# Patient Record
(demographics unavailable — no encounter records)

---

## 2025-02-14 NOTE — HISTORY OF PRESENT ILLNESS
[Home] : at home, [unfilled] , at the time of the visit. [Medical Office: ([facility])___] : at the medical office located in  [Telehealth (audio & video)] : This visit was provided via telehealth using real-time 2-way audio visual technology. [Verbal consent obtained from patient] : the patient, [unfilled] [FreeTextEntry1] : 44 para 1 () PMH anxiety/depression, denies PSH presents with urgency, UUI, frequency q 30 minutes - no pads, changes underwear. She denies JACIEL. She denies nocturia. She denies obstructive symptoms. She denies prolapse sensation. Denies UTIs/STIs. Denies bowel issues.  pvr - 0 ml  24  OAB responding to oxybutynin, 40% improvement.  Denies interval change in med/surg history.  Plan: Increase oxybutynin 15 mg daily F/u in 4-6 weeks  24  28 yo woman with oab-wet improved significantly since last visit when oxybutynin increased to 15, almost "normal" now at 60% improvement.  Denies interval change in med/surg history.  Plan: Continue oxybutynin 15 mg daily. She wants to try PT as well. We reviewed the options of pelvic floor physical therapy. We discussed the benefits of improved pelvic strength and balanced tone. We reviewed the basic goals and expected schedule. All questions answered regarding pelvic floor physical therapy.  25  27-year-old woman with OAB-wet currently on anticholinergic with continued stability, however feels like there is definitely room for improvement.  She has been going to physical therapy and she states it has helped as well in terms of her irritative symptoms.  Plan: Given the fact that she feels like there is room for improvement, I have recommended changing her anticholinergic to Gemtesa 75 mg p.o. daily, side effects reviewed.  Continue with physical therapy and performing exercises at home.  Follow-up in 6 to 8 weeks.

## 2025-02-14 NOTE — HISTORY OF PRESENT ILLNESS
[Home] : at home, [unfilled] , at the time of the visit. [Medical Office: ([facility])___] : at the medical office located in  [Telehealth (audio & video)] : This visit was provided via telehealth using real-time 2-way audio visual technology. [Verbal consent obtained from patient] : the patient, [unfilled] [FreeTextEntry1] : 44 para 1 () PMH anxiety/depression, denies PSH presents with urgency, UUI, frequency q 30 minutes - no pads, changes underwear. She denies JACIEL. She denies nocturia. She denies obstructive symptoms. She denies prolapse sensation. Denies UTIs/STIs. Denies bowel issues.  pvr - 0 ml  24  OAB responding to oxybutynin, 40% improvement.  Denies interval change in med/surg history.  Plan: Increase oxybutynin 15 mg daily F/u in 4-6 weeks  24  26 yo woman with oab-wet improved significantly since last visit when oxybutynin increased to 15, almost "normal" now at 60% improvement.  Denies interval change in med/surg history.  Plan: Continue oxybutynin 15 mg daily. She wants to try PT as well. We reviewed the options of pelvic floor physical therapy. We discussed the benefits of improved pelvic strength and balanced tone. We reviewed the basic goals and expected schedule. All questions answered regarding pelvic floor physical therapy.  25  27-year-old woman with OAB-wet currently on anticholinergic with continued stability, however feels like there is definitely room for improvement.  She has been going to physical therapy and she states it has helped as well in terms of her irritative symptoms.  Plan: Given the fact that she feels like there is room for improvement, I have recommended changing her anticholinergic to Gemtesa 75 mg p.o. daily, side effects reviewed.  Continue with physical therapy and performing exercises at home.  Follow-up in 6 to 8 weeks.

## 2025-06-26 NOTE — HISTORY OF PRESENT ILLNESS
[FreeTextEntry1] : The patient, MAGGI LORENZO and Provider participated in the telehealth encounter.   The patient, MAGGI LORENZO, was located at home, [address] [place] [address] , at the time of the visit.  The provider, GLENDY ANDREW, was located at the medical office located in [facility] at the time of the visit. This visit was provided via telehealth using real-time 2-way audio visual technology.     Verbal consent for telehealth services was given on 2025 by the patient, MAGGI LORENZO.  44 para 1 () PMH anxiety/depression, denies PSH presents with urgency, UUI, frequency q 30 minutes - no pads, changes underwear. She denies JACIEL. She denies nocturia. She denies obstructive symptoms. She denies prolapse sensation. Denies UTIs/STIs. Denies bowel issues.  pvr - 0 ml  24  OAB responding to oxybutynin, 40% improvement.  Denies interval change in med/surg history.  Plan: Increase oxybutynin 15 mg daily F/u in 4-6 weeks  24  26 yo woman with oab-wet improved significantly since last visit when oxybutynin increased to 15, almost "normal" now at 60% improvement.  Denies interval change in med/surg history.  Plan: Continue oxybutynin 15 mg daily. She wants to try PT as well. We reviewed the options of pelvic floor physical therapy. We discussed the benefits of improved pelvic strength and balanced tone. We reviewed the basic goals and expected schedule. All questions answered regarding pelvic floor physical therapy.  25  27-year-old woman with OAB-wet currently on anticholinergic with continued stability, however feels like there is definitely room for improvement. She has been going to physical therapy and she states it has helped as well in terms of her irritative symptoms.  Plan: Given the fact that she feels like there is room for improvement, I have recommended changing her anticholinergic to Gemtesa 75 mg p.o. daily, side effects reviewed. Continue with physical therapy and performing exercises at home. Follow-up in 6 to 8 weeks.  25  No show.

## 2025-06-26 NOTE — HISTORY OF PRESENT ILLNESS
[FreeTextEntry1] : The patient, MAGGI LORENZO and Provider participated in the telehealth encounter.   The patient, MAGGI LORENZO, was located at home, [address] [place] [address] , at the time of the visit.  The provider, GLENDY ANDREW, was located at the medical office located in [facility] at the time of the visit. This visit was provided via telehealth using real-time 2-way audio visual technology.     Verbal consent for telehealth services was given on 2025 by the patient, MAGGI LORENZO.  44 para 1 () PMH anxiety/depression, denies PSH presents with urgency, UUI, frequency q 30 minutes - no pads, changes underwear. She denies JACIEL. She denies nocturia. She denies obstructive symptoms. She denies prolapse sensation. Denies UTIs/STIs. Denies bowel issues.  pvr - 0 ml  24  OAB responding to oxybutynin, 40% improvement.  Denies interval change in med/surg history.  Plan: Increase oxybutynin 15 mg daily F/u in 4-6 weeks  24  26 yo woman with oab-wet improved significantly since last visit when oxybutynin increased to 15, almost "normal" now at 60% improvement.  Denies interval change in med/surg history.  Plan: Continue oxybutynin 15 mg daily. She wants to try PT as well. We reviewed the options of pelvic floor physical therapy. We discussed the benefits of improved pelvic strength and balanced tone. We reviewed the basic goals and expected schedule. All questions answered regarding pelvic floor physical therapy.  25  27-year-old woman with OAB-wet currently on anticholinergic with continued stability, however feels like there is definitely room for improvement. She has been going to physical therapy and she states it has helped as well in terms of her irritative symptoms.  Plan: Given the fact that she feels like there is room for improvement, I have recommended changing her anticholinergic to Gemtesa 75 mg p.o. daily, side effects reviewed. Continue with physical therapy and performing exercises at home. Follow-up in 6 to 8 weeks.  25  No show.

## 2025-06-26 NOTE — HISTORY OF PRESENT ILLNESS
[FreeTextEntry1] : The patient, MAGGI LORENZO and Provider participated in the telehealth encounter.   The patient, MAGGI LORENZO, was located at home, [address] [place] [address] , at the time of the visit.  The provider, GLENDY ANDREW, was located at the medical office located in [facility] at the time of the visit. This visit was provided via telehealth using real-time 2-way audio visual technology.     Verbal consent for telehealth services was given on 2025 by the patient, MAGGI LORENZO.  44 para 1 () PMH anxiety/depression, denies PSH presents with urgency, UUI, frequency q 30 minutes - no pads, changes underwear. She denies JACIEL. She denies nocturia. She denies obstructive symptoms. She denies prolapse sensation. Denies UTIs/STIs. Denies bowel issues.  pvr - 0 ml  24  OAB responding to oxybutynin, 40% improvement.  Denies interval change in med/surg history.  Plan: Increase oxybutynin 15 mg daily F/u in 4-6 weeks  24  28 yo woman with oab-wet improved significantly since last visit when oxybutynin increased to 15, almost "normal" now at 60% improvement.  Denies interval change in med/surg history.  Plan: Continue oxybutynin 15 mg daily. She wants to try PT as well. We reviewed the options of pelvic floor physical therapy. We discussed the benefits of improved pelvic strength and balanced tone. We reviewed the basic goals and expected schedule. All questions answered regarding pelvic floor physical therapy.  25  27-year-old woman with OAB-wet currently on anticholinergic with continued stability, however feels like there is definitely room for improvement. She has been going to physical therapy and she states it has helped as well in terms of her irritative symptoms.  Plan: Given the fact that she feels like there is room for improvement, I have recommended changing her anticholinergic to Gemtesa 75 mg p.o. daily, side effects reviewed. Continue with physical therapy and performing exercises at home. Follow-up in 6 to 8 weeks.  25  No show.